# Patient Record
Sex: MALE | Race: WHITE | NOT HISPANIC OR LATINO | URBAN - METROPOLITAN AREA
[De-identification: names, ages, dates, MRNs, and addresses within clinical notes are randomized per-mention and may not be internally consistent; named-entity substitution may affect disease eponyms.]

---

## 2022-01-14 ENCOUNTER — IMPORTED ENCOUNTER (OUTPATIENT)
Dept: URBAN - METROPOLITAN AREA CLINIC 31 | Facility: CLINIC | Age: 62
End: 2022-01-14

## 2022-01-14 PROBLEM — H40.1131: Noted: 2022-01-14

## 2022-01-14 PROBLEM — H40.033: Noted: 2022-01-14

## 2022-01-14 PROCEDURE — 99203 OFFICE O/P NEW LOW 30 MIN: CPT

## 2022-01-14 PROCEDURE — 92132 CPTRZD OPH DX IMG ANT SGM: CPT

## 2022-01-14 NOTE — PATIENT DISCUSSION
Narrow angles OU: Risk of angle closure discussed. OCT shows occludable angle OD  Recommend Yag PI OD Schedule as desired s/p yag PI OS. MIld cupping OS IOP excellent on dropsGet copies of old records Dr Bertha Boas VF for review.

## 2022-01-14 NOTE — PATIENT DISCUSSION
Primary open angle glaucoma OU mild - Continue with current treatment plan. Discussed importance of compliance. Will continue to monitor for stability or progression. Anterior segment OCT done today. Get previous records from Dr. Jackson Lara. Schedule Yag PI OD.  Position PI superior due to pt seeing light on left side of OS vision after OS PI.

## 2022-02-04 ENCOUNTER — IMPORTED ENCOUNTER (OUTPATIENT)
Dept: URBAN - METROPOLITAN AREA CLINIC 31 | Facility: CLINIC | Age: 62
End: 2022-02-04

## 2022-02-04 PROBLEM — H40.89: Noted: 2022-02-04

## 2022-02-04 PROCEDURE — 92133 CPTRZD OPH DX IMG PST SGM ON: CPT

## 2022-02-04 PROCEDURE — 92020 GONIOSCOPY: CPT

## 2022-02-04 PROCEDURE — 99214 OFFICE O/P EST MOD 30 MIN: CPT

## 2022-02-04 NOTE — PATIENT DISCUSSION
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 IOP at target on lumigan qhs Continue with current treatment plan. Can change to latanoprost when sample runs out. OCT today shows normal NFL thickness moderate thinning OS Discussed importance of compliance.   Will continue to monitor for stability or progression

## 2022-04-02 ASSESSMENT — TONOMETRY
OS_IOP_MMHG: 14
OS_IOP_MMHG: 16
OD_IOP_MMHG: 18
OD_IOP_MMHG: 16

## 2022-04-02 ASSESSMENT — VISUAL ACUITY
OS_CC: 20/25-2
OD_CC: 20/30-2
OD_PH: SC 20/20
OS_CC: 20/25-1
OD_CC: 20/30-2
OS_PH: SC 20/20

## 2022-06-15 ENCOUNTER — FOLLOW UP (OUTPATIENT)
Dept: URBAN - METROPOLITAN AREA CLINIC 29 | Facility: CLINIC | Age: 62
End: 2022-06-15

## 2022-06-15 DIAGNOSIS — H40.89: ICD-10-CM

## 2022-06-15 PROCEDURE — 99213 OFFICE O/P EST LOW 20 MIN: CPT

## 2022-06-15 ASSESSMENT — TONOMETRY
OD_IOP_MMHG: 16
OS_IOP_MMHG: 21

## 2022-06-15 ASSESSMENT — VISUAL ACUITY
OS_SC: 20/20
OD_SC: 20/20-2

## 2022-06-15 NOTE — PATIENT DISCUSSION
The IOP is in the target range. The patient will continue the current management.  Creeping up OS but not sure he is getting drop in consistently, Reviewed drop installation.

## 2022-09-14 ENCOUNTER — FOLLOW UP (OUTPATIENT)
Dept: URBAN - METROPOLITAN AREA CLINIC 29 | Facility: CLINIC | Age: 62
End: 2022-09-14

## 2022-09-14 DIAGNOSIS — H40.89: ICD-10-CM

## 2022-09-14 DIAGNOSIS — Z98.890: ICD-10-CM

## 2022-09-14 DIAGNOSIS — H52.4: ICD-10-CM

## 2022-09-14 PROCEDURE — 92012 INTRM OPH EXAM EST PATIENT: CPT

## 2022-09-14 ASSESSMENT — VISUAL ACUITY
OS_SC: 20/20-1
OD_SC: 20/20-2

## 2022-09-14 ASSESSMENT — TONOMETRY
OD_IOP_MMHG: 17
OS_IOP_MMHG: 21

## 2022-09-14 NOTE — PATIENT DISCUSSION
The IOP is borderline. The patient will continue the current management.  repeat VF next visit t/c change in meds.

## 2022-09-14 NOTE — PATIENT DISCUSSION
The IOP is borderline. The patient will continue the current management at this time.  Check VF next visit.

## 2023-01-13 ENCOUNTER — FOLLOW UP (OUTPATIENT)
Dept: URBAN - METROPOLITAN AREA CLINIC 29 | Facility: CLINIC | Age: 63
End: 2023-01-13

## 2023-01-13 DIAGNOSIS — H40.89: ICD-10-CM

## 2023-01-13 DIAGNOSIS — Z98.890: ICD-10-CM

## 2023-01-13 DIAGNOSIS — H52.4: ICD-10-CM

## 2023-01-13 PROCEDURE — 92083 EXTENDED VISUAL FIELD XM: CPT

## 2023-01-13 PROCEDURE — 92012 INTRM OPH EXAM EST PATIENT: CPT

## 2023-01-13 ASSESSMENT — VISUAL ACUITY
OS_SC: 20/20
OD_SC: 20/20

## 2023-01-13 ASSESSMENT — TONOMETRY
OS_IOP_MMHG: 20
OD_IOP_MMHG: 16

## 2023-12-22 ENCOUNTER — COMPREHENSIVE EXAM (OUTPATIENT)
Dept: URBAN - METROPOLITAN AREA CLINIC 29 | Facility: CLINIC | Age: 63
End: 2023-12-22

## 2023-12-22 DIAGNOSIS — H40.89: ICD-10-CM

## 2023-12-22 DIAGNOSIS — Z98.890: ICD-10-CM

## 2023-12-22 PROCEDURE — 92133 CPTRZD OPH DX IMG PST SGM ON: CPT

## 2023-12-22 PROCEDURE — 92014 COMPRE OPH EXAM EST PT 1/>: CPT

## 2023-12-22 ASSESSMENT — VISUAL ACUITY
OS_SC: 20/20-1
OD_SC: 20/25+2

## 2023-12-22 ASSESSMENT — TONOMETRY
OS_IOP_MMHG: 22
OD_IOP_MMHG: 23

## 2024-02-20 ENCOUNTER — COMPREHENSIVE EXAM (OUTPATIENT)
Dept: URBAN - METROPOLITAN AREA CLINIC 29 | Facility: CLINIC | Age: 64
End: 2024-02-20

## 2024-02-20 DIAGNOSIS — H40.89: ICD-10-CM

## 2024-02-20 DIAGNOSIS — Z98.890: ICD-10-CM

## 2024-02-20 PROCEDURE — 99213 OFFICE O/P EST LOW 20 MIN: CPT

## 2024-02-20 ASSESSMENT — VISUAL ACUITY
OD_SC: 20/25+3
OS_SC: 20/20

## 2024-02-20 ASSESSMENT — TONOMETRY
OS_IOP_MMHG: 16
OD_IOP_MMHG: 14

## 2024-08-26 ENCOUNTER — FOLLOW UP (OUTPATIENT)
Dept: URBAN - METROPOLITAN AREA CLINIC 29 | Facility: CLINIC | Age: 64
End: 2024-08-26

## 2024-08-26 DIAGNOSIS — Z98.890: ICD-10-CM

## 2024-08-26 DIAGNOSIS — H40.89: ICD-10-CM

## 2024-08-26 PROCEDURE — 92083 EXTENDED VISUAL FIELD XM: CPT

## 2024-08-26 PROCEDURE — 99213 OFFICE O/P EST LOW 20 MIN: CPT

## 2024-08-26 ASSESSMENT — VISUAL ACUITY
OD_SC: 20/25-1
OS_SC: 20/20-1

## 2024-08-26 ASSESSMENT — TONOMETRY
OD_IOP_MMHG: 15
OS_IOP_MMHG: 17

## 2025-02-27 ENCOUNTER — FOLLOW UP (OUTPATIENT)
Age: 65
End: 2025-02-27

## 2025-02-27 DIAGNOSIS — Z98.890: ICD-10-CM

## 2025-02-27 DIAGNOSIS — H40.89: ICD-10-CM

## 2025-02-27 PROCEDURE — 92020 GONIOSCOPY: CPT

## 2025-02-27 PROCEDURE — 92133 CPTRZD OPH DX IMG PST SGM ON: CPT

## 2025-02-27 PROCEDURE — 99213 OFFICE O/P EST LOW 20 MIN: CPT

## 2025-08-25 ENCOUNTER — FOLLOW UP (OUTPATIENT)
Age: 65
End: 2025-08-25

## 2025-08-25 DIAGNOSIS — H40.89: ICD-10-CM

## 2025-08-25 DIAGNOSIS — Z98.890: ICD-10-CM

## 2025-08-25 PROCEDURE — 99213 OFFICE O/P EST LOW 20 MIN: CPT

## 2025-08-25 PROCEDURE — 92083 EXTENDED VISUAL FIELD XM: CPT
